# Patient Record
Sex: MALE | Race: BLACK OR AFRICAN AMERICAN
[De-identification: names, ages, dates, MRNs, and addresses within clinical notes are randomized per-mention and may not be internally consistent; named-entity substitution may affect disease eponyms.]

---

## 2020-08-15 ENCOUNTER — HOSPITAL ENCOUNTER (EMERGENCY)
Dept: HOSPITAL 63 - ER | Age: 25
Discharge: HOME | End: 2020-08-15
Payer: OTHER GOVERNMENT

## 2020-08-15 VITALS — HEIGHT: 67 IN | WEIGHT: 205.47 LBS | BODY MASS INDEX: 32.25 KG/M2

## 2020-08-15 VITALS — SYSTOLIC BLOOD PRESSURE: 123 MMHG | DIASTOLIC BLOOD PRESSURE: 78 MMHG

## 2020-08-15 DIAGNOSIS — R10.11: Primary | ICD-10-CM

## 2020-08-15 DIAGNOSIS — I10: ICD-10-CM

## 2020-08-15 DIAGNOSIS — Z20.828: ICD-10-CM

## 2020-08-15 DIAGNOSIS — R06.02: ICD-10-CM

## 2020-08-15 LAB
ALBUMIN SERPL-MCNC: 4.3 G/DL (ref 3.4–5)
ALBUMIN/GLOB SERPL: 1.2 {RATIO} (ref 1–1.7)
ALP SERPL-CCNC: 56 U/L (ref 46–116)
ALT SERPL-CCNC: 64 U/L (ref 16–63)
ANION GAP SERPL CALC-SCNC: 11 MMOL/L (ref 6–14)
APTT PPP: YELLOW S
AST SERPL-CCNC: 49 U/L (ref 15–37)
BACTERIA #/AREA URNS HPF: 0 /HPF
BASOPHILS # BLD AUTO: 0 X10^3/UL (ref 0–0.2)
BASOPHILS NFR BLD: 0 % (ref 0–3)
BILIRUB SERPL-MCNC: 0.6 MG/DL (ref 0.2–1)
BILIRUB UR QL STRIP: (no result)
BUN/CREAT SERPL: 12 (ref 6–20)
CA-I SERPL ISE-MCNC: 13 MG/DL (ref 8–26)
CALCIUM SERPL-MCNC: 9.4 MG/DL (ref 8.5–10.1)
CHLORIDE SERPL-SCNC: 103 MMOL/L (ref 98–107)
CO2 SERPL-SCNC: 26 MMOL/L (ref 21–32)
CREAT SERPL-MCNC: 1.1 MG/DL (ref 0.7–1.3)
EOSINOPHIL NFR BLD: 0 X10^3/UL (ref 0–0.7)
EOSINOPHIL NFR BLD: 1 % (ref 0–3)
ERYTHROCYTE [DISTWIDTH] IN BLOOD BY AUTOMATED COUNT: 13.9 % (ref 11.5–14.5)
FIBRINOGEN PPP-MCNC: CLEAR MG/DL
GFR SERPLBLD BASED ON 1.73 SQ M-ARVRAT: 100.4 ML/MIN
GLOBULIN SER-MCNC: 3.7 G/DL (ref 2.2–3.8)
GLUCOSE SERPL-MCNC: 96 MG/DL (ref 70–99)
GLUCOSE UR STRIP-MCNC: (no result) MG/DL
HCT VFR BLD CALC: 46.3 % (ref 39–53)
HGB BLD-MCNC: 15.6 G/DL (ref 13–17.5)
LIPASE: 76 U/L (ref 73–393)
LYMPHOCYTES # BLD: 1.1 X10^3/UL (ref 1–4.8)
LYMPHOCYTES NFR BLD AUTO: 19 % (ref 24–48)
MAGNESIUM SERPL-MCNC: 1.9 MG/DL (ref 1.8–2.4)
MCH RBC QN AUTO: 30 PG (ref 25–35)
MCHC RBC AUTO-ENTMCNC: 34 G/DL (ref 31–37)
MCV RBC AUTO: 88 FL (ref 79–100)
MONO #: 0.4 X10^3/UL (ref 0–1.1)
MONOCYTES NFR BLD: 8 % (ref 0–9)
NEUT #: 4.2 X10^3UL (ref 1.8–7.7)
NEUTROPHILS NFR BLD AUTO: 72 % (ref 31–73)
NITRITE UR QL STRIP: (no result)
PLATELET # BLD AUTO: 240 X10^3/UL (ref 140–400)
POTASSIUM SERPL-SCNC: 4.3 MMOL/L (ref 3.5–5.1)
PROT SERPL-MCNC: 8 G/DL (ref 6.4–8.2)
RBC # BLD AUTO: 5.25 X10^6/UL (ref 4.3–5.7)
RBC #/AREA URNS HPF: (no result) /HPF (ref 0–2)
SODIUM SERPL-SCNC: 140 MMOL/L (ref 136–145)
SP GR UR STRIP: 1.01
SQUAMOUS #/AREA URNS LPF: (no result) /LPF
UROBILINOGEN UR-MCNC: 0.2 MG/DL
WBC # BLD AUTO: 5.8 X10^3/UL (ref 4–11)
WBC #/AREA URNS HPF: (no result) /HPF (ref 0–4)

## 2020-08-15 PROCEDURE — 83735 ASSAY OF MAGNESIUM: CPT

## 2020-08-15 PROCEDURE — 85025 COMPLETE CBC W/AUTO DIFF WBC: CPT

## 2020-08-15 PROCEDURE — 96361 HYDRATE IV INFUSION ADD-ON: CPT

## 2020-08-15 PROCEDURE — 96375 TX/PRO/DX INJ NEW DRUG ADDON: CPT

## 2020-08-15 PROCEDURE — 71260 CT THORAX DX C+: CPT

## 2020-08-15 PROCEDURE — 80053 COMPREHEN METABOLIC PANEL: CPT

## 2020-08-15 PROCEDURE — 74177 CT ABD & PELVIS W/CONTRAST: CPT

## 2020-08-15 PROCEDURE — 85610 PROTHROMBIN TIME: CPT

## 2020-08-15 PROCEDURE — 83690 ASSAY OF LIPASE: CPT

## 2020-08-15 PROCEDURE — 96374 THER/PROPH/DIAG INJ IV PUSH: CPT

## 2020-08-15 PROCEDURE — 87591 N.GONORRHOEAE DNA AMP PROB: CPT

## 2020-08-15 PROCEDURE — 99285 EMERGENCY DEPT VISIT HI MDM: CPT

## 2020-08-15 PROCEDURE — 85730 THROMBOPLASTIN TIME PARTIAL: CPT

## 2020-08-15 PROCEDURE — 36415 COLL VENOUS BLD VENIPUNCTURE: CPT

## 2020-08-15 PROCEDURE — 87491 CHLMYD TRACH DNA AMP PROBE: CPT

## 2020-08-15 PROCEDURE — 81001 URINALYSIS AUTO W/SCOPE: CPT

## 2020-08-15 NOTE — RAD
CT CHEST ABD PELVIS W/CONTRAST 

 

Clinical Indication: Chest pain. Right upper quadrant pain.

 

COMPARISON: None

 

TECHNIQUE:

 

Multiple contiguous axial images were obtained throughout the chest, 

abdomen, and pelvis with the use of IV contrast. Axial images were 

reformatted into coronal and sagittal planes. 74 mL Omnipaque 300 was 

administered. One or more of the following dose reduction techniques were 

utilized: Automated exposure control (AEC), Adjustment of mA and/or kV 

according to patient size, Use of iterative reconstruction technique such 

as ASiR, CT scan done according to ALARA and image gently/image wisely.

 

Findings:  

The thyroid is symmetric. There is no axillary, mediastinal, or hilar 

adenopathy.  

 

The thoracic aorta diameter is normal. The cardiac size is normal.  There 

is no pericardial effusion. The central airways are patent. Residual 

thymic tissue

 

There is no suspicious pulmonary nodule. There is no focal consolidation. 

No pleural effusion is observed. There is no pneumothorax.

 

The liver, gallbladder, spleen, pancreas, and adrenal glands are 

unremarkable.  The kidneys are unremarkable.  There is no significant 

mesenteric or retroperitoneal adenopathy identified.  There is no evidence

of free intraperitoneal fluid or pneumoperitoneum.  Visualized portions of

the bowel are grossly unremarkable. 

 

The bladder and distal ureters are unremarkable.  There is no significant 

pelvic ascites.  No significant iliac or inguinal adenopathy is 

identified.  

 

No acute osseous abnormality.

 

IMPRESSION:

No acute findings in the chest, abdomen, or pelvis

 

Electronically signed by: Armando Rodrigues MD (8/15/2020 5:01 PM) [facility]

## 2020-08-15 NOTE — PHYS DOC
Past History


Past Medical History:  Hypertension


Past Surgical History:  No Surgical History


Alcohol Use:  None





General Adult


EDM:


Chief Complaint:  SHORTNESS OF BREATH





HPI:


HPI:





Patient is a [age] year old [sex] who presents with []





Review of Systems:


Review of Systems:


Constitutional:  Denies fever or chills 


Eyes:  Denies change in visual acuity 


HENT:  Denies nasal congestion or sore throat 


Respiratory:  Denies cough or shortness of breath 


Cardiovascular:  Denies chest pain or edema 


GI:  Denies abdominal pain, nausea, vomiting, bloody stools or diarrhea 


: Denies dysuria 


Musculoskeletal:  Denies back pain or joint pain 


Integument:  Denies rash 


Neurologic:  Denies headache, focal weakness or sensory changes 


Endocrine:  Denies polyuria or polydipsia 


Lymphatic:  Denies swollen glands 


Psychiatric:  Denies depression or anxiety





Heart Score:


Risk Factors:


Risk Factors:  DM, Current or recent (<one month) smoker, HTN, HLP, family 

history of CAD, obesity.


Risk Scores:


Score 0 - 3:  2.5% MACE over next 6 weeks - Discharge Home


Score 4 - 6:  20.3% MACE over next 6 weeks - Admit for Clinical Observation


Score 7 - 10:  72.7% MACE over next 6 weeks - Early Invasive Strategies





Current Medications:


Current Meds:





Current Medications








 Medications


  (Trade)  Dose


 Ordered  Sig/Betsy  Start Time


 Stop Time Status Last Admin


Dose Admin


 


 Famotidine


  (Pepcid Vial)  20 mg  1X  ONCE  8/15/20 16:00


 8/15/20 16:01 DC 8/15/20 16:00


20 MG


 


 Iohexol


  (Omnipaque 300


 Mg/ml)  75 ml  1X  ONCE  8/15/20 16:15


 8/15/20 16:16 DC 8/15/20 16:36


75 ML


 


 Ketorolac


 Tromethamine


  (Toradol 15mg


 Vial)  15 mg  1X  ONCE  8/15/20 16:00


 8/15/20 16:01 DC 8/15/20 16:00


15 MG


 


 Ondansetron HCl


  (Zofran)  4 mg  1X  ONCE  8/15/20 16:00


 8/15/20 16:01 DC 8/15/20 16:00


4 MG


 


 Sodium Chloride  1,000 ml @ 


 1,000 mls/hr  1X  ONCE  8/15/20 16:00


 8/15/20 16:59 DC 8/15/20 16:00


1,000 MLS/HR











Allergies:


Allergies:





Allergies








Coded Allergies Type Severity Reaction Last Updated Verified


 


  No Known Drug Allergies    8/15/20 No











Physical Exam:


PE:





Constitutional: Well developed, well nourished, no acute distress, non-toxic 

appearance. []


HENT: Normocephalic, atraumatic, bilateral external ears normal, oropharynx jeff

st, no oral exudates, nose normal. []


Eyes: PERRLA, EOMI, conjunctiva normal, no discharge. [] 


Neck: Normal range of motion, no tenderness, supple, no stridor. [] 


Cardiovascular:Heart rate regular rhythm, no murmur []


Lungs & Thorax:  Bilateral breath sounds clear to auscultation []


Abdomen: Bowel sounds normal, soft, no tenderness, no masses, no pulsatile 

masses. [] 


Skin: Warm, dry, no erythema, no rash. [] 


Back: No tenderness, no CVA tenderness. [] 


Extremities: No tenderness, no cyanosis, no clubbing, ROM intact, no edema. [] 


Neurologic: Alert and oriented X 3, normal motor function, normal sensory 

function, no focal deficits noted. []


Psychologic: Affect normal, judgement normal, mood normal. []





Current Patient Data:


Labs:





                                Laboratory Tests








Test


 8/15/20


16:25 8/15/20


17:40 8/15/20


18:17


 


White Blood Count


 5.8 x10^3/uL


(4.0-11.0) 


 





 


Red Blood Count


 5.25 x10^6/uL


(4.30-5.70) 


 





 


Hemoglobin


 15.6 g/dL


(13.0-17.5) 


 





 


Hematocrit


 46.3 %


(39.0-53.0) 


 





 


Mean Corpuscular Volume


 88 fL ()


 


 





 


Mean Corpuscular Hemoglobin 30 pg (25-35)    


 


Mean Corpuscular Hemoglobin


Concent 34 g/dL


(31-37) 


 





 


Red Cell Distribution Width


 13.9 %


(11.5-14.5) 


 





 


Platelet Count


 240 x10^3/uL


(140-400) 


 





 


Neutrophils (%) (Auto) 72 % (31-73)    


 


Lymphocytes (%) (Auto) 19 % (24-48)  L  


 


Monocytes (%) (Auto) 8 % (0-9)    


 


Eosinophils (%) (Auto) 1 % (0-3)    


 


Basophils (%) (Auto) 0 % (0-3)    


 


Neutrophils # (Auto)


 4.2 x10^3uL


(1.8-7.7) 


 





 


Lymphocytes # (Auto)


 1.1 x10^3/uL


(1.0-4.8) 


 





 


Monocytes # (Auto)


 0.4 x10^3/uL


(0.0-1.1) 


 





 


Eosinophils # (Auto)


 0.0 x10^3/uL


(0.0-0.7) 


 





 


Basophils # (Auto)


 0.0 x10^3/uL


(0.0-0.2) 


 





 


Sodium Level


 140 mmol/L


(136-145) 


 





 


Potassium Level


 4.3 mmol/L


(3.5-5.1) 


 





 


Chloride Level


 103 mmol/L


() 


 





 


Carbon Dioxide Level


 26 mmol/L


(21-32) 


 





 


Anion Gap 11 (6-14)    


 


Blood Urea Nitrogen


 13 mg/dL


(8-26) 


 





 


Creatinine


 1.1 mg/dL


(0.7-1.3) 


 





 


Estimated GFR


(Cockcroft-Gault) 100.4  


 


 





 


BUN/Creatinine Ratio 12 (6-20)    


 


Glucose Level


 96 mg/dL


(70-99) 


 





 


Calcium Level


 9.4 mg/dL


(8.5-10.1) 


 





 


Magnesium Level


 1.9 mg/dL


(1.8-2.4) 


 





 


Total Bilirubin


 0.6 mg/dL


(0.2-1.0) 


 





 


Aspartate Amino Transferase


(AST) 49 U/L (15-37)


H 


 





 


Alanine Aminotransferase (ALT)


 64 U/L (16-63)


H 


 





 


Alkaline Phosphatase


 56 U/L


() 


 





 


Total Protein


 8.0 g/dL


(6.4-8.2) 


 





 


Albumin


 4.3 g/dL


(3.4-5.0) 


 





 


Albumin/Globulin Ratio 1.2 (1.0-1.7)    


 


Lipase


 76 U/L


() 


 





 


Prothrombin Time


 


 10.3 SEC


(9.4-11.4) 





 


Prothrombin Time INR  1.0 (0.9-1.1)   


 


Activated Partial


Thromboplast Time 


 24 SEC (23-33)


 





 


Urine Collection Type   Unknown  


 


Urine Color   Yellow  


 


Urine Clarity   Clear  


 


Urine pH   7.0  


 


Urine Specific Gravity   1.015  


 


Urine Protein


 


 


 Neg


(NEG-TRACE)


 


Urine Glucose (UA)


 


 


 Neg mg/dL


(NEG)


 


Urine Ketones (Stick)


 


 


 Neg mg/dL


(NEG)


 


Urine Blood   Neg (NEG)  


 


Urine Nitrite   Neg (NEG)  


 


Urine Bilirubin   Neg (NEG)  


 


Urine Urobilinogen Dipstick


 


 


 0.2 mg/dL (0.2


mg/dL)


 


Urine Leukocyte Esterase   Neg (NEG)  


 


Urine RBC


 


 


 1-2 /HPF (0-2)





 


Urine WBC


 


 


 1-4 /HPF (0-4)





 


Urine Squamous Epithelial


Cells 


 


 Occ /LPF  





 


Urine Bacteria


 


 


 0 /HPF (0-FEW)











Vital Signs:





                                   Vital Signs








  Date Time  Temp Pulse Resp B/P (MAP) Pulse Ox O2 Delivery O2 Flow Rate FiO2


 


8/15/20 18:30  71 16 123/78 (93) 99 Room Air  


 


8/15/20 15:25 98.7       











EKG:


EKG:


[]





Radiology/Procedures:


Radiology/Procedures:


[]





Course & Med Decision Making:


Course & Med Decision Making


Pertinent Labs and Imaging studies reviewed. (See chart for details)





[]





Dragon Disclaimer:


Dragon Disclaimer:


This electronic medical record was generated, in whole or in part, using a voice

 recognition dictation system.





Departure


Departure:


Impression:  


   Primary Impression:  


   Abdominal pain


   Qualified Codes:  R10.11 - Right upper quadrant pain


   Additional Impression:  


   Suspected 2019 novel coronavirus infection


Disposition:  01 HOME/RESIDENCE PRIOR TO ADM


Condition:  STABLE


Referrals:  


[facility] (PCP)








SAFIA SANTIAGO MD


Patient Instructions:  Abdominal Pain (Nonspecific)





Additional Instructions:  


You have been tested for or diagnosed with COVID-19. It is an infection caused 

by a new type 





of coronavirus. COVID-19 will cause cold-like or mild flu symptoms in most. It 

can cause 


more severe symptoms like problems breathing in some.





There is no treatment for COVID-19. The body will clear the infection over time.

 Self-care 


will help to ease discomfort.





Steps to Take:


Self-Care


Rest as needed. Healthy habits may help you feel better. Steps include:





Choose healthy foods including fruits and vegetables. Drink water throughout the

 day.


Get plenty of sleep each night.


If you smoke, try to quit. It may ease breathing.


Avoid alcohol.


Keep Others Healthy


The virus can spread to others. Droplets are released every time you sneeze or 

cough. The 


droplets can get into the mouth, nose, or eyes of people near you and lead to 

infection. To 


lower the chances of spreading COVID-19 to others:





Stay at home until your doctor has said it is safe to leave. If you tested 

positive this 


will mean staying isolated until both of the following are true:





At least 7 days have passed since the start of illness.


You are free of fever for at least 72 hours without the use of medicine.


During this time:





 - Avoid public areas, events, or transportation. Do not return to work or 

school until your 





doctor has said it is safe to do so.


 - Call ahead if you need to go to a medical center. Let them know you may have 

COVID-19. It 





will help them guide you where to go. They may also ask you to wear a facemask 

when you come 





to the office.


 - If you call for emergency medical services, let them know you may have COVID-

19.


While at home:





 - Try to avoid close contact with others. Stay about 6 feet away.


 - If possible, spend most of your time in a separate room from others.


 - Use a face mask if you will be in close contact with others such as sharing a

 room or 


vehicle.


 - Have someone wipe down common surfaces in the home. Use household  

every day on 


areas like doorknobs, counters, or sinks.


 - Cough or sneeze into a tissue. Throw the tissue away right after use. If a 

tissue is not 


available, cough or sneeze into your elbow.


 - Wash your hands often. Wash them after sneezing or coughing. Use soap and 

water and wash 


for at least 20 seconds. Alcohol based hand  can be used if soap and 

water is not 


available.


 - Do not prepare food for others. Avoid sharing personal items like forks, 

spoons, or 


toothbrushes.


 - Avoid close contact with pets while you are sick. There is no evidence of the

 virus 


passing to pets. This is a safety step until more is known about this virus.


Isolation can be frustrating. Social interaction can help. Keep in touch with 

friends and 


family through phone and tech options. You can still interact with others in 

your home, just 





keep a safe distance of about 6 feet.





Follow-up:


Your doctors office will check in with you to see if there are any changes in 

your health. 


You may be asked to keep track of symptoms to share with them. They will also 

let you know 


when you are clear to be in public again.





Problems to Look Out For:


Contact your doctor if your recovery is not going as you expect. Get emergency 

care if you 


have problems such as:





 - Trouble breathing


 - Nonstop chest pain or pressure


 - Changes in awareness, confusion, or problems waking


 - Lips or face have bluish color


 - Worsening of symptoms


If you think you have an emergency, call for emergency medical services right 

away.





As taken from [facility]


Scripts


Ondansetron (ONDANSETRON ODT) 4 Mg Tab.rapdis


1 TAB PO PRN Q6-8HRS PRN for NAUSEA, #16 TAB


   Prov: AMANDA RANGEL DO         8/15/20 


Hyoscyamine Sulfate (LEVSIN-SL) 0.125 Mg Tab.subl


0.125 MG SL Q4-6HRS PRN for PAIN, #14 TAB


   Prov: AMANDA RANGEL DO         8/15/20 


Famotidine (PEPCID) 20 Mg Tablet


1 TAB PO BID for Gastritis for 7 Days, #14 TAB


   Prov: AMANDA RANGEL DO         8/15/20





Justification of Admission:


Justification of Admission:


Justification of Admission Dx:  N/A











AMANDA RANGEL DO             Aug 15, 2020 19:09

## 2021-08-28 ENCOUNTER — HOSPITAL ENCOUNTER (EMERGENCY)
Dept: HOSPITAL 63 - ER | Age: 26
Discharge: HOME | End: 2021-08-28
Payer: OTHER GOVERNMENT

## 2021-08-28 VITALS — SYSTOLIC BLOOD PRESSURE: 159 MMHG | DIASTOLIC BLOOD PRESSURE: 78 MMHG

## 2021-08-28 VITALS — BODY MASS INDEX: 34.19 KG/M2 | WEIGHT: 217.82 LBS | HEIGHT: 67 IN

## 2021-08-28 DIAGNOSIS — I10: ICD-10-CM

## 2021-08-28 DIAGNOSIS — H53.19: Primary | ICD-10-CM

## 2021-08-28 PROCEDURE — 99282 EMERGENCY DEPT VISIT SF MDM: CPT
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
